# Patient Record
Sex: MALE | Race: WHITE | NOT HISPANIC OR LATINO
[De-identification: names, ages, dates, MRNs, and addresses within clinical notes are randomized per-mention and may not be internally consistent; named-entity substitution may affect disease eponyms.]

---

## 2020-09-14 ENCOUNTER — TRANSCRIPTION ENCOUNTER (OUTPATIENT)
Age: 75
End: 2020-09-14

## 2020-09-25 ENCOUNTER — APPOINTMENT (OUTPATIENT)
Dept: CT IMAGING | Facility: CLINIC | Age: 75
End: 2020-09-25
Payer: MEDICARE

## 2020-09-25 PROCEDURE — 72131 CT LUMBAR SPINE W/O DYE: CPT

## 2020-10-23 ENCOUNTER — NON-APPOINTMENT (OUTPATIENT)
Age: 75
End: 2020-10-23

## 2020-10-23 DIAGNOSIS — E66.3 OVERWEIGHT: ICD-10-CM

## 2020-10-23 DIAGNOSIS — I10 ESSENTIAL (PRIMARY) HYPERTENSION: ICD-10-CM

## 2020-10-23 DIAGNOSIS — M54.5 LOW BACK PAIN: ICD-10-CM

## 2020-10-23 RX ORDER — MEMANTINE HYDROCHLORIDE 28 MG/1
28 CAPSULE, EXTENDED RELEASE ORAL
Refills: 0 | Status: ACTIVE | COMMUNITY

## 2020-10-30 ENCOUNTER — NON-APPOINTMENT (OUTPATIENT)
Age: 75
End: 2020-10-30

## 2020-10-30 DIAGNOSIS — R41.3 OTHER AMNESIA: ICD-10-CM

## 2020-10-30 DIAGNOSIS — R35.1 NOCTURIA: ICD-10-CM

## 2020-10-30 DIAGNOSIS — R45.4 IRRITABILITY AND ANGER: ICD-10-CM

## 2020-11-04 ENCOUNTER — NON-APPOINTMENT (OUTPATIENT)
Age: 75
End: 2020-11-04

## 2020-12-16 ENCOUNTER — APPOINTMENT (OUTPATIENT)
Dept: NEUROLOGY | Facility: CLINIC | Age: 75
End: 2020-12-16
Payer: MEDICARE

## 2020-12-16 VITALS
WEIGHT: 178 LBS | HEIGHT: 71 IN | BODY MASS INDEX: 24.92 KG/M2 | HEART RATE: 69 BPM | DIASTOLIC BLOOD PRESSURE: 76 MMHG | SYSTOLIC BLOOD PRESSURE: 145 MMHG | RESPIRATION RATE: 16 BRPM

## 2020-12-16 VITALS — TEMPERATURE: 96.7 F

## 2020-12-16 DIAGNOSIS — H91.93 UNSPECIFIED HEARING LOSS, BILATERAL: ICD-10-CM

## 2020-12-16 PROCEDURE — 99214 OFFICE O/P EST MOD 30 MIN: CPT

## 2020-12-16 NOTE — ASSESSMENT
[FreeTextEntry1] : Mr. Watson is a 75-year-old with subacute cognitive decline, amnestic type due to Alzheimer's disease.  He is being optimally treated with donepezil and memantine.  He remains functional but has severe short-term memory loss.  I believe that his profound hearing loss is contributing to his functional difficulties.  I strongly encouraged him to purchase hearing aids.  I will reevaluate him in the spring 2021.  Telephone contact will be maintained in the meantime.

## 2020-12-16 NOTE — CONSULT LETTER
[Dear  ___] : Dear  [unfilled], [Consult Letter:] : I had the pleasure of evaluating your patient, [unfilled]. [Please see my note below.] : Please see my note below. [Consult Closing:] : Thank you very much for allowing me to participate in the care of this patient.  If you have any questions, please do not hesitate to contact me. [Sincerely,] : Sincerely, [FreeTextEntry3] : Chauncey Morales MD\par

## 2020-12-16 NOTE — HISTORY OF PRESENT ILLNESS
[FreeTextEntry1] : Mr. Jez Watson was last evaluated on September 17, 2020 via telemedicine. He is a 75-year-old right-handed gentleman who has been under my care since 2013 for mild amnestic symptoms. CSF analysis was consistent with Alzheimer's disease. He remains on donepezil 10 mg and Namenda XR 28 mg per day.\par \par Mrs. Watson is [place].  She complains that her 's memory continues to falter.  He has absolutely no short-term memory.  He steps away to use the bathroom while watching television and when he returns, he has no recollection what he was watching.  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  He is extremely hard of hearing and refuses to use a hearing aid.  He is independent in all ADLs.  He continues to drive.

## 2020-12-16 NOTE — PHYSICAL EXAM
[FreeTextEntry1] : Constitutional:  Patient was well-developed, well-nourished and in no acute distress. \par \par Head:  Normocephalic, atraumatic. Tympanic membranes were clear. \par \par Neck:  Supple with full range of motion. \par \par Cardiovascular:  Cardiac rhythm was regular without murmur. There were no carotid bruits. Peripheral pulses were full and symmetric. \par \par Respiratory:  Lungs were clear. \par \par Abdomen:  Soft and nontender. \par \par Spine:  Nontender. \par \par Skin:  There were no rashes. \par \par NEUROLOGICAL EXAMINATION:\par \par Mental Status: Patient was alert and oriented.  He scored 25 out of 30 on MMSE.  He incorrectly stated the date as the 15th and the day as Tuesday rather than Wednesday.  He had absolutely no recall of 3 words after several minutes.  He scored 24 out of 30 last visit.  Speech was fluent. There was no dysarthria. \par \par Cranial Nerves: \par \par II: He could finger count bilaterally. Pupils were equal and reactive. Visual fields were full.  Fundi were not visualized.\par \par III, IV, VI:  Eye movements were full without nystagmus. \par \par V: Facial sensation was intact. \par \par VII: Facial strength was normal. \par \par VIII: Hearing was severely diminished bilaterally.\par \par IX, X: Palatal movement was normal. Phonation was normal. \par \par XI: Sternocleidomastoids and trapezii were normal. \par \par XII: Tongue was midline and movements normal. There was no lingual atrophy or fasciculations. \par \par Motor Examination: Muscle bulk, tone and strength were normal. \par \par Sensory Examination: Pinprick, vibration and joint position sense were intact. \par \par Reflexes: DTRs were 2 throughout except for the ankle jerks which were absent.\par \par Plantar Responses: Plantar responses were flexor. \par \par Coordination/Cerebellar Function: There was no dysmetria on finger to nose or heel to shin testing. \par \par Gait/Stance: Gait was stable.

## 2021-01-04 RX ORDER — MEMANTINE HYDROCHLORIDE 28 MG/1
28 CAPSULE, EXTENDED RELEASE ORAL
Qty: 90 | Refills: 1 | Status: ACTIVE | COMMUNITY
Start: 2021-01-04 | End: 1900-01-01

## 2021-01-14 ENCOUNTER — APPOINTMENT (OUTPATIENT)
Dept: NEUROLOGY | Facility: CLINIC | Age: 76
End: 2021-01-14

## 2021-04-23 ENCOUNTER — APPOINTMENT (OUTPATIENT)
Dept: ULTRASOUND IMAGING | Facility: CLINIC | Age: 76
End: 2021-04-23
Payer: MEDICARE

## 2021-04-23 PROCEDURE — 76882 US LMTD JT/FCL EVL NVASC XTR: CPT | Mod: LT

## 2021-06-17 ENCOUNTER — APPOINTMENT (OUTPATIENT)
Dept: NEUROLOGY | Facility: CLINIC | Age: 76
End: 2021-06-17
Payer: MEDICARE

## 2021-06-17 VITALS
SYSTOLIC BLOOD PRESSURE: 123 MMHG | DIASTOLIC BLOOD PRESSURE: 68 MMHG | BODY MASS INDEX: 25.34 KG/M2 | WEIGHT: 177 LBS | HEIGHT: 70 IN | HEART RATE: 73 BPM

## 2021-06-17 PROCEDURE — 99072 ADDL SUPL MATRL&STAF TM PHE: CPT

## 2021-06-17 PROCEDURE — 99213 OFFICE O/P EST LOW 20 MIN: CPT

## 2021-06-17 NOTE — ASSESSMENT
[FreeTextEntry1] : Mr. Watson is a 76-year-old with longstanding mild cognitive impairment, amnestic type.  CSF analysis in 2014 was consistent with Alzheimer's disease.  The lack of any major decline over the years certainly places that diagnosis in question.\par \par I suggested that he enroll in the IDEAS2 study.  This will allow performance of an amyloid PET scan.  He will continue donepezil and memantine in the meantime.  Further management will depend upon his clinical course.

## 2021-06-17 NOTE — HISTORY OF PRESENT ILLNESS
[FreeTextEntry1] : Mr. Jez Watson returned to the office having been last seen on December 16, 2020. He is a 76-year-old right-handed gentleman who has been under my care since 2013 for mild amnestic symptoms. CSF analysis was consistent with Alzheimer's disease. He remains on donepezil 10 mg and Namenda XR 28 mg per day.\par \par Mr. Watson is doing relatively well.  He is working at [employer] in the [employer] in [place] 2 days a week, 4 hours each day.  He is very social and courteous.  Short-term memory is mildly worse.  He however is entirely functional.  He is able to dress, bathe, toilet himself and drive.\par \par He lives in a [place] endemic area and has had multiple tick bites.  He is under the care of an internist who is monitoring his blood tests.

## 2021-06-17 NOTE — PHYSICAL EXAM
[FreeTextEntry1] : Constitutional:  Patient was well-developed, well-nourished and in no acute distress. \par \par Head:  Normocephalic, atraumatic. Tympanic membranes were clear. \par \par Neck:  Supple with full range of motion. \par \par Cardiovascular:  Cardiac rhythm was regular without murmur. There were no carotid bruits. Peripheral pulses were full and symmetric. \par \par Respiratory:  Lungs were clear. \par \par Abdomen:  Soft and nontender. \par \par Spine:  Nontender. \par \par Skin:  There were no rashes. \par \par NEUROLOGICAL EXAMINATION:\par \par Mental Status: Patient was alert and oriented.  He scored 27 out of 30 on MMSE, a two-point improvement compared to his last visit.  He made 2 errors in orientation to date and recalled 2 of 3 words.\par Cranial Nerves: \par \par II: He could finger count bilaterally. Pupils were equal and reactive. Visual fields were full.  Fundi were not visualized.\par \par III, IV, VI:  Eye movements were full without nystagmus. \par \par V: Facial sensation was intact. \par \par VII: Facial strength was normal. \par \par VIII: Hearing was severely diminished bilaterally.\par \par IX, X: Palatal movement was normal. Phonation was normal. \par \par XI: Sternocleidomastoids and trapezii were normal. \par \par XII: Tongue was midline and movements normal. There was no lingual atrophy or fasciculations. \par \par Motor Examination: Muscle bulk, tone and strength were normal. \par \par Sensory Examination: Pinprick, vibration and joint position sense were intact. \par \par Reflexes: DTRs were 2 throughout except for the ankle jerks which were absent.\par \par Plantar Responses: Plantar responses were flexor. \par \par Coordination/Cerebellar Function: There was no dysmetria on finger to nose or heel to shin testing. \par \par Gait/Stance: Gait was stable.

## 2021-06-17 NOTE — REVIEW OF SYSTEMS
[Confused or Disoriented] : confusion [Memory Lapses or Loss] : memory loss [Loss Of Hearing] : hearing loss

## 2021-07-11 ENCOUNTER — RX RENEWAL (OUTPATIENT)
Age: 76
End: 2021-07-11

## 2021-07-11 RX ORDER — MEMANTINE HYDROCHLORIDE 28 MG/1
28 CAPSULE, EXTENDED RELEASE ORAL DAILY
Qty: 90 | Refills: 3 | Status: ACTIVE | COMMUNITY
Start: 2020-12-27 | End: 1900-01-01

## 2021-12-02 ENCOUNTER — APPOINTMENT (OUTPATIENT)
Dept: NEUROLOGY | Facility: CLINIC | Age: 76
End: 2021-12-02
Payer: MEDICARE

## 2021-12-02 VITALS
HEIGHT: 70 IN | SYSTOLIC BLOOD PRESSURE: 100 MMHG | WEIGHT: 168 LBS | HEART RATE: 79 BPM | DIASTOLIC BLOOD PRESSURE: 55 MMHG | BODY MASS INDEX: 24.05 KG/M2

## 2021-12-02 PROCEDURE — 99213 OFFICE O/P EST LOW 20 MIN: CPT

## 2021-12-02 NOTE — HISTORY OF PRESENT ILLNESS
[FreeTextEntry1] : Mr. Jez Watson returned to the office having been last seen on June 17, 2021. He is a 76-year-old right-handed gentleman who has been under my care since 2013 for mild amnestic symptoms. CSF analysis was consistent with Alzheimer's disease. He remains on donepezil 10 mg and Namenda XR 28 mg per day.\par \par Mr. Watson was well until October 29 when he appeared ashen, weak and nauseated.  He was evaluated at [facility] in [place].  There was a mild elevation of his white blood count.  Chest x-ray was negative and CT of the brain revealed atrophy and white matter changes.  He recovered spontaneously and has been well ever since.  \par \par He suffers from worsening bilateral tinnitus.  He has never experienced vertigo before.  He is very social and courteous.  Mrs. Watson describes him as chatty.  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  Despite a failing memory, he is entirely functional.  He is able to dress, bathe, toilet himself and drive.\par \par

## 2021-12-02 NOTE — ASSESSMENT
[FreeTextEntry1] : Mr. Watson is a 76-year-old with longstanding mild cognitive impairment, amnestic type.  CSF analysis in 2014 was consistent with Alzheimer's disease.  I suspect that his October 29th episode may have represented peripheral vestibular dysfunction.  I cannot exclude a small posterior circulation TIA.  There was no confirmation of stroke on CT.  He has a pacemaker which was implanted over a decade ago and is likely not MR-compatible.  He is scheduled to see an otolaryngologist.\par \par Because the cost of extended release memantine is prohibitive.  I suggested switching to memantine 10 mg twice a day.  He will continue donepezil 10 mg daily.  He will be reevaluated in 6 months.  Telephone contact will be maintained in the meantime.

## 2021-12-02 NOTE — PHYSICAL EXAM
[FreeTextEntry1] : Constitutional:  Patient was well-developed, well-nourished and in no acute distress. \par \par Head:  Normocephalic, atraumatic. Tympanic membranes were clear. \par \par Neck:  Supple with full range of motion. \par \par Cardiovascular:  Cardiac rhythm was regular without murmur. There were no carotid bruits. Peripheral pulses were full and symmetric. \par \par Respiratory:  Lungs were clear. \par \par Abdomen:  Soft and nontender. \par \par Spine:  Nontender. \par \par Skin:  There were no rashes. \par \par NEUROLOGICAL EXAMINATION:\par \par Mental Status: Patient was alert and congenial.  He scored 25 out of 30 on MMSE, a two-point decline compared to his last visit with the same score as December 2020.  He made 3 errors in orientation to date and 2 in recall.\par \par Cranial Nerves: \par \par II: He could finger count bilaterally. Pupils were equal and reactive. Visual fields were full.  The right optic disc was sharp and the left was poorly visualized.\par \par III, IV, VI:  Eye movements were full without nystagmus. \par \par V: Facial sensation was intact. \par \par VII: Facial strength was normal. \par \par VIII: Hearing was severely diminished bilaterally.\par \par IX, X: Palatal movement was normal. Phonation was normal. \par \par XI: Sternocleidomastoids and trapezii were normal. \par \par XII: Tongue was midline and movements normal. There was no lingual atrophy or fasciculations. \par \par Motor Examination: Muscle bulk, tone and strength were normal. \par \par Sensory Examination: Pinprick, vibration and joint position sense were intact. \par \par Reflexes: DTRs were 2 throughout except for the ankle jerks which were absent.\par \par Plantar Responses: Plantar responses were flexor. \par \par Coordination/Cerebellar Function: There was no dysmetria on finger to nose or heel to shin testing. \par \par Gait/Stance: Gait was stable.  Posture was stooped.  Romberg was negative.  Tandem was mildly unsteady.

## 2022-02-04 RX ORDER — DONEPEZIL HYDROCHLORIDE 10 MG/1
10 TABLET ORAL DAILY
Qty: 90 | Refills: 3 | Status: ACTIVE | COMMUNITY
Start: 2022-02-04 | End: 1900-01-01

## 2022-03-09 ENCOUNTER — APPOINTMENT (OUTPATIENT)
Dept: GASTROENTEROLOGY | Facility: CLINIC | Age: 77
End: 2022-03-09
Payer: MEDICARE

## 2022-03-09 VITALS
WEIGHT: 168 LBS | TEMPERATURE: 97.5 F | HEART RATE: 70 BPM | OXYGEN SATURATION: 98 % | BODY MASS INDEX: 24.05 KG/M2 | RESPIRATION RATE: 18 BRPM | HEIGHT: 70 IN | DIASTOLIC BLOOD PRESSURE: 61 MMHG | SYSTOLIC BLOOD PRESSURE: 122 MMHG

## 2022-03-09 DIAGNOSIS — R63.4 ABNORMAL WEIGHT LOSS: ICD-10-CM

## 2022-03-09 DIAGNOSIS — K62.5 HEMORRHAGE OF ANUS AND RECTUM: ICD-10-CM

## 2022-03-09 PROCEDURE — 99203 OFFICE O/P NEW LOW 30 MIN: CPT

## 2022-03-09 NOTE — REVIEW OF SYSTEMS
[As noted in HPI] : as noted in HPI [Negative] : Respiratory [Fever] : no fever [Chills] : no chills

## 2022-03-09 NOTE — ASSESSMENT
[FreeTextEntry1] : \par 77 year old man with recent rectal bleeding and 10 lb weight loss\par \par Last colonoscopy 10 years ago\par Schedule EGD and colonoscopy

## 2022-03-09 NOTE — HISTORY OF PRESENT ILLNESS
[FreeTextEntry1] : PCP = Dr. Milton Shankar\par \par Tends to be more on the constipated side\par Saw  [facility] on one occasion a few months ago - saw PC - referred for colonoscopy\par \par (+) 10 lb weight loss over the last year\par \par s/p stent 2004, PPM 2009\par Not on Plavix

## 2022-06-03 DIAGNOSIS — Z20.822 CONTACT WITH AND (SUSPECTED) EXPOSURE TO COVID-19: ICD-10-CM

## 2022-06-15 ENCOUNTER — APPOINTMENT (OUTPATIENT)
Dept: NEUROLOGY | Facility: CLINIC | Age: 77
End: 2022-06-15

## 2022-06-27 ENCOUNTER — NON-APPOINTMENT (OUTPATIENT)
Age: 77
End: 2022-06-27

## 2022-07-06 ENCOUNTER — APPOINTMENT (OUTPATIENT)
Dept: GASTROENTEROLOGY | Facility: CLINIC | Age: 77
End: 2022-07-06

## 2022-07-06 ENCOUNTER — LABORATORY RESULT (OUTPATIENT)
Age: 77
End: 2022-07-06

## 2022-07-06 PROCEDURE — 43239 EGD BIOPSY SINGLE/MULTIPLE: CPT

## 2022-07-06 PROCEDURE — 45380 COLONOSCOPY AND BIOPSY: CPT

## 2022-07-20 ENCOUNTER — APPOINTMENT (OUTPATIENT)
Dept: NEUROLOGY | Facility: CLINIC | Age: 77
End: 2022-07-20

## 2022-09-28 ENCOUNTER — APPOINTMENT (OUTPATIENT)
Dept: UROLOGY | Facility: CLINIC | Age: 77
End: 2022-09-28

## 2022-09-28 VITALS
HEIGHT: 70 IN | HEART RATE: 106 BPM | TEMPERATURE: 97.3 F | SYSTOLIC BLOOD PRESSURE: 145 MMHG | BODY MASS INDEX: 24.05 KG/M2 | WEIGHT: 168 LBS | DIASTOLIC BLOOD PRESSURE: 77 MMHG

## 2022-09-28 PROCEDURE — 99204 OFFICE O/P NEW MOD 45 MIN: CPT

## 2022-09-28 RX ORDER — MIRABEGRON 25 MG/1
25 TABLET, FILM COATED, EXTENDED RELEASE ORAL
Qty: 90 | Refills: 1 | Status: ACTIVE | COMMUNITY
Start: 2022-09-28 | End: 1900-01-01

## 2022-09-28 NOTE — HISTORY OF PRESENT ILLNESS
[FreeTextEntry1] : 77 year-old patient presented today to discussed the possible of treatment. He suffers form LUTS for many years and was treated with Doxazosin 2 mg daily . He still continue to suffer from urgency, frequency and weak stream. \par He also had several episodes of dizziness with decreased in blood pressure \par No Family history of PCa \par His wife reported that he has Alzheimer

## 2022-09-28 NOTE — PHYSICAL EXAM
[General Appearance - Well Developed] : well developed [General Appearance - Well Nourished] : well nourished [Normal Appearance] : normal appearance [Well Groomed] : well groomed [General Appearance - In No Acute Distress] : no acute distress [Edema] : no peripheral edema [Respiration, Rhythm And Depth] : normal respiratory rhythm and effort [Exaggerated Use Of Accessory Muscles For Inspiration] : no accessory muscle use [Abdomen Soft] : soft [Abdomen Tenderness] : non-tender [Costovertebral Angle Tenderness] : no ~M costovertebral angle tenderness [Urethral Meatus] : meatus normal [Urinary Bladder Findings] : the bladder was normal on palpation [Scrotum] : the scrotum was normal [Testes Mass (___cm)] : there were no testicular masses [FreeTextEntry1] : we did US and found that prostate is enlarged - 67 ml and PVR is around 100 ml [Normal Station and Gait] : the gait and station were normal for the patient's age [] : no rash [No Focal Deficits] : no focal deficits [Oriented To Time, Place, And Person] : oriented to person, place, and time [Affect] : the affect was normal [Mood] : the mood was normal [Not Anxious] : not anxious [No Palpable Adenopathy] : no palpable adenopathy

## 2022-09-28 NOTE — ASSESSMENT
[FreeTextEntry1] : Suffers from LUTS caused by BPH + possible effect of Alzheimer disease. \par He is on Doxazosin (Cardura) 2 mg and suffers form dizziness and episodes of decreased Blood Pressure \par We did US and found enlarged prostate 67 ml and  ml\par WE started with Tamsulosin 0.4 mg and stopped Doxazosin \par We also discussed the place for Mirbetrique 25 mg \par

## 2022-10-01 ENCOUNTER — RX RENEWAL (OUTPATIENT)
Age: 77
End: 2022-10-01

## 2022-10-12 ENCOUNTER — RX RENEWAL (OUTPATIENT)
Age: 77
End: 2022-10-12

## 2022-11-07 ENCOUNTER — APPOINTMENT (OUTPATIENT)
Dept: UROLOGY | Facility: CLINIC | Age: 77
End: 2022-11-07

## 2022-11-07 VITALS
HEIGHT: 70 IN | WEIGHT: 168 LBS | DIASTOLIC BLOOD PRESSURE: 47 MMHG | BODY MASS INDEX: 24.05 KG/M2 | HEART RATE: 60 BPM | SYSTOLIC BLOOD PRESSURE: 84 MMHG | TEMPERATURE: 97.3 F

## 2022-11-07 PROCEDURE — 99213 OFFICE O/P EST LOW 20 MIN: CPT

## 2022-11-07 NOTE — HISTORY OF PRESENT ILLNESS
[FreeTextEntry1] : 77 year-old patient presented today for the follow-up.\par He suffers form LUTS for many years and was treated with Doxazosin 2 mg daily. Because he had several episodes of dizziness with decreased in blood pressure we decided to stop Doxazosin and started with Tamsulosin 0.4 mg \par He also suffered from urgency, frequency and we started with Trospium \par His wife reported that he has Alzheimer  \par Today he has no complaints and stated that he feels much better, however, his wife told us that he still suffers from dizziness.

## 2022-11-07 NOTE — ASSESSMENT
[FreeTextEntry1] : We discussed te following plan:\par To continue with Tamsulosin and Trospium till the next appointment with PCP\par If the only thing that could explain the dizziness is Tamsulosin - we can consider to stop it\par The next possible step will be Urolift .\par We discussed with he patient in details the procedure and its benefits and adverse effects.\par Patient and his wife understood.  \par

## 2022-11-16 ENCOUNTER — NON-APPOINTMENT (OUTPATIENT)
Age: 77
End: 2022-11-16

## 2022-11-17 ENCOUNTER — NON-APPOINTMENT (OUTPATIENT)
Age: 77
End: 2022-11-17

## 2022-12-20 ENCOUNTER — RX RENEWAL (OUTPATIENT)
Age: 77
End: 2022-12-20

## 2022-12-21 ENCOUNTER — OFFICE (OUTPATIENT)
Dept: URBAN - METROPOLITAN AREA CLINIC 27 | Facility: CLINIC | Age: 77
Setting detail: OPHTHALMOLOGY
End: 2022-12-21
Payer: MEDICARE

## 2022-12-21 ENCOUNTER — APPOINTMENT (OUTPATIENT)
Dept: NEUROLOGY | Facility: CLINIC | Age: 77
End: 2022-12-21

## 2022-12-21 VITALS
SYSTOLIC BLOOD PRESSURE: 144 MMHG | HEIGHT: 70 IN | DIASTOLIC BLOOD PRESSURE: 77 MMHG | HEART RATE: 68 BPM | WEIGHT: 168 LBS | BODY MASS INDEX: 24.05 KG/M2

## 2022-12-21 DIAGNOSIS — H40.013: ICD-10-CM

## 2022-12-21 DIAGNOSIS — H43.813: ICD-10-CM

## 2022-12-21 DIAGNOSIS — H25.13: ICD-10-CM

## 2022-12-21 DIAGNOSIS — H52.4: ICD-10-CM

## 2022-12-21 DIAGNOSIS — H35.363: ICD-10-CM

## 2022-12-21 PROBLEM — H52.7 REFRACTIVE ERROR: Status: ACTIVE | Noted: 2022-12-21

## 2022-12-21 PROCEDURE — 99214 OFFICE O/P EST MOD 30 MIN: CPT

## 2022-12-21 PROCEDURE — 92015 DETERMINE REFRACTIVE STATE: CPT | Performed by: OPHTHALMOLOGY

## 2022-12-21 PROCEDURE — 92014 COMPRE OPH EXAM EST PT 1/>: CPT | Performed by: OPHTHALMOLOGY

## 2022-12-21 ASSESSMENT — REFRACTION_CURRENTRX
OS_ADD: +2.50
OD_SPHERE: +2.00
OS_CYLINDER: +1.00
OS_VPRISM_DIRECTION: PROGS
OS_CYLINDER: SPH
OD_VPRISM_DIRECTION: SV
OS_AXIS: 175
OD_CYLINDER: SPH
OS_SPHERE: +2.00
OD_AXIS: 180
OS_SPHERE: +3.00
OD_OVR_VA: 20/
OD_SPHERE: +3.00
OS_VPRISM_DIRECTION: SV
OD_ADD: +2.50
OD_OVR_VA: 20/
OD_VPRISM_DIRECTION: PROGS
OD_CYLINDER: +1.00
OS_OVR_VA: 20/
OS_OVR_VA: 20/

## 2022-12-21 ASSESSMENT — TONOMETRY
OD_IOP_MMHG: 15
OD_IOP_MMHG: 14
OS_IOP_MMHG: 15
OS_IOP_MMHG: 19

## 2022-12-21 ASSESSMENT — REFRACTION_MANIFEST
OD_AXIS: 170
OS_AXIS: 090
OD_SPHERE: +1.50
OD_ADD: +2.50
OS_CYLINDER: -1.00
OS_SPHERE: +3.00
OD_SPHERE: +3.00
OS_CYLINDER: +1.00
OS_SPHERE: +1.25
OD_VA1: 20/25+2
OD_ADD: +2.50
OS_AXIS: 180
OD_CYLINDER: +0.75
OS_ADD: +2.50
OD_AXIS: 090
OS_VA1: 20/25-3
OD_CYLINDER: -1.00
OS_ADD: +2.50

## 2022-12-21 ASSESSMENT — SPHEQUIV_DERIVED
OD_SPHEQUIV: 2.5
OD_SPHEQUIV: 1.875
OS_SPHEQUIV: 1.75
OS_SPHEQUIV: 1.625
OS_SPHEQUIV: 2.5
OD_SPHEQUIV: 2.375

## 2022-12-21 ASSESSMENT — AXIALLENGTH_DERIVED
OS_AL: 22.3227
OD_AL: 22.5721
OS_AL: 22.5875
OS_AL: 22.6322
OD_AL: 22.7973
OD_AL: 22.6168

## 2022-12-21 ASSESSMENT — REFRACTION_AUTOREFRACTION
OD_CYLINDER: +2.25
OS_CYLINDER: +2.75
OD_SPHERE: +1.25
OS_SPHERE: +0.25
OD_AXIS: 178
OS_AXIS: 003

## 2022-12-21 ASSESSMENT — KERATOMETRY
OS_AXISANGLE_DEGREES: 001
OD_K1POWER_DIOPTERS: 41.50
OD_AXISANGLE_DEGREES: 176
OD_K2POWER_DIOPTERS: 46.00
OS_K2POWER_DIOPTERS: 45.75
OS_K1POWER_DIOPTERS: 43.25

## 2022-12-21 ASSESSMENT — VISUAL ACUITY
OD_BCVA: 20/20
OS_BCVA: 20/20

## 2022-12-21 ASSESSMENT — CONFRONTATIONAL VISUAL FIELD TEST (CVF)
OS_FINDINGS: FULL
OD_FINDINGS: FULL

## 2022-12-21 NOTE — ASSESSMENT
[FreeTextEntry1] : Mr. Watson is a 77-year-old with longstanding mild cognitive impairment, amnestic type.  CSF analysis in 2014 was consistent with Alzheimer's disease.  Despite stable scoring on MMSE, his wife describes progressive cognitive worsening which is not unexpected.  I suggested continuing donepezil and memantine at their current doses.  Further management will depend upon his clinical course.  He will return to the office in 6 months for routine follow-up.  Telephone contact will be maintained in the meantime.

## 2022-12-21 NOTE — PHYSICAL EXAM
[FreeTextEntry1] : Constitutional:  Patient was well-developed, well-nourished and in no acute distress. \par \par Head:  Normocephalic, atraumatic. Tympanic membranes were clear. \par \par Neck:  Supple with full range of motion. \par \par Cardiovascular:  Cardiac rhythm was regular without murmur. There were no carotid bruits. Peripheral pulses were full and symmetric. \par \par Respiratory:  Lungs were clear. \par \par Abdomen:  Soft and nontender. \par \par Spine:  Nontender. \par \par Skin:  There were no rashes. \par \par NEUROLOGICAL EXAMINATION:\par \par Mental Status: Patient was alert and congenial.  He scored 24 out of 30 on MMSE, a one-point decline from his last visit.  He was disoriented regarding the date being unable to recall the month or exact date or season.  He had no recall.  He was able to spell world backwards but required several attempts.\par \par Cranial Nerves: \par \par II: He could finger count bilaterally. Pupils were equal and reactive. Visual fields were full.  The fundi were poorly visualized.\par \par III, IV, VI:  Eye movements were full without nystagmus. \par \par V: Facial sensation was intact. \par \par VII: Facial strength was normal. \par \par VIII: Hearing was severely diminished bilaterally.\par \par IX, X: Palatal movement was normal. Phonation was normal. \par \par XI: Sternocleidomastoids and trapezii were normal. \par \par XII: Tongue was midline and movements normal. There was no lingual atrophy or fasciculations. \par \par Motor Examination: Muscle bulk, tone and strength were normal. \par \par Sensory Examination: Pinprick, vibration and joint position sense were intact. \par \par Reflexes: DTRs were 2 throughout except for the ankle jerks which were absent.\par \par Plantar Responses: Plantar responses were flexor. \par \par Coordination/Cerebellar Function: There was no dysmetria on finger to nose or heel to shin testing. \par \par Gait/Stance: Gait was stable.  Posture was stooped.  Romberg was negative.  Tandem was mildly unsteady.

## 2022-12-21 NOTE — CONSULT LETTER
[Dear  ___] : Dear  [unfilled], [Consult Letter:] : I had the pleasure of evaluating your patient, [unfilled]. [Please see my note below.] : Please see my note below. [Consult Closing:] : Thank you very much for allowing me to participate in the care of this patient.  If you have any questions, please do not hesitate to contact me. [Sincerely,] : Sincerely, [FreeTextEntry3] : Chauncey Morales MD\par  [DrDarrell  ___] : Dr. FERREIRA

## 2022-12-21 NOTE — HISTORY OF PRESENT ILLNESS
[FreeTextEntry1] : Mr. Jez Watson returned to the office having been last seen on December 2, 2021. He is a 77-year-old right-handed gentleman who has been under my care since 2013 for mild amnestic symptoms. CSF analysis was consistent with Alzheimer's disease. He remains on donepezil 10 mg and memantine 10 mg twice a day.\par \par Mr. Watson was accompanied to the office by Mrs. Watson.  She feels that his cognitive abilities have continued to wane.  He is more frequently repetitive.  He has difficulty retaining.  She describes him as "overly optimistic."  He continues to work at [employer] in [place].  He works a short shift 1 to 3 days a week.  He continues to drive and only rarely becomes lost.  He is able to dress, bathe and toilet himself.  He is always well-groomed.\par \par He has documented postural hypotension and is currently on midodrine 5 mg twice a day.  His other medications include donepezil 10 mg daily, memantine 10 mg twice a day, trospium, Ecotrin 81 mg daily, tamsulosin, multivitamin, vitamin B12 and vitamin D3.\par

## 2023-01-17 ENCOUNTER — RX RENEWAL (OUTPATIENT)
Age: 78
End: 2023-01-17

## 2023-02-01 RX ORDER — DONEPEZIL HYDROCHLORIDE 10 MG/1
10 TABLET ORAL DAILY
Qty: 90 | Refills: 3 | Status: ACTIVE | COMMUNITY
Start: 2023-02-01 | End: 1900-01-01

## 2023-02-07 ENCOUNTER — NON-APPOINTMENT (OUTPATIENT)
Age: 78
End: 2023-02-07

## 2023-02-07 ENCOUNTER — APPOINTMENT (OUTPATIENT)
Dept: ULTRASOUND IMAGING | Facility: CLINIC | Age: 78
End: 2023-02-07
Payer: MEDICARE

## 2023-02-07 PROCEDURE — 76700 US EXAM ABDOM COMPLETE: CPT

## 2023-03-26 ENCOUNTER — RX RENEWAL (OUTPATIENT)
Age: 78
End: 2023-03-26

## 2023-04-19 ENCOUNTER — APPOINTMENT (OUTPATIENT)
Dept: UROLOGY | Facility: CLINIC | Age: 78
End: 2023-04-19
Payer: MEDICARE

## 2023-04-19 VITALS
DIASTOLIC BLOOD PRESSURE: 68 MMHG | WEIGHT: 165 LBS | SYSTOLIC BLOOD PRESSURE: 118 MMHG | HEIGHT: 70 IN | BODY MASS INDEX: 23.62 KG/M2 | HEART RATE: 80 BPM | TEMPERATURE: 97.4 F

## 2023-04-19 DIAGNOSIS — N40.1 BENIGN PROSTATIC HYPERPLASIA WITH LOWER URINARY TRACT SYMPMS: ICD-10-CM

## 2023-04-19 DIAGNOSIS — R39.11 HESITANCY OF MICTURITION: ICD-10-CM

## 2023-04-19 DIAGNOSIS — N39.41 URGE INCONTINENCE: ICD-10-CM

## 2023-04-19 DIAGNOSIS — N13.8 BENIGN PROSTATIC HYPERPLASIA WITH LOWER URINARY TRACT SYMPMS: ICD-10-CM

## 2023-04-19 PROCEDURE — 99214 OFFICE O/P EST MOD 30 MIN: CPT

## 2023-04-20 NOTE — HISTORY OF PRESENT ILLNESS
[FreeTextEntry1] : 78 year-old patient presented today for the follow-up.\par Last visit we started this Tamsulosin 0.4 mg \par He also suffered from urgency, frequency and we started with Trospium \par His wife reported that he has Alzheimer  \par Today he has no complaints and stated that he feels much better, however, his wife told us that he still suffers from dizziness. \par However, his wife told that sometimes he has a problem to start urination.

## 2023-05-08 ENCOUNTER — RX RENEWAL (OUTPATIENT)
Age: 78
End: 2023-05-08

## 2023-05-09 ENCOUNTER — RX RENEWAL (OUTPATIENT)
Age: 78
End: 2023-05-09

## 2023-07-09 ENCOUNTER — RX RENEWAL (OUTPATIENT)
Age: 78
End: 2023-07-09

## 2023-07-09 RX ORDER — TAMSULOSIN HYDROCHLORIDE 0.4 MG/1
0.4 CAPSULE ORAL
Qty: 90 | Refills: 0 | Status: ACTIVE | COMMUNITY
Start: 2022-09-28 | End: 1900-01-01

## 2023-08-04 NOTE — REVIEW OF SYSTEMS
[Confused or Disoriented] : confusion [Memory Lapses or Loss] : memory loss [Loss Of Hearing] : hearing loss 0 (no pain/absence of nonverbal indicators of pain)

## 2023-09-18 ENCOUNTER — OFFICE (OUTPATIENT)
Dept: URBAN - METROPOLITAN AREA CLINIC 27 | Facility: CLINIC | Age: 78
Setting detail: OPHTHALMOLOGY
End: 2023-09-18
Payer: MEDICARE

## 2023-09-18 ENCOUNTER — APPOINTMENT (OUTPATIENT)
Dept: NEUROLOGY | Facility: CLINIC | Age: 78
End: 2023-09-18
Payer: MEDICARE

## 2023-09-18 VITALS
WEIGHT: 168 LBS | DIASTOLIC BLOOD PRESSURE: 54 MMHG | HEIGHT: 70 IN | SYSTOLIC BLOOD PRESSURE: 94 MMHG | BODY MASS INDEX: 24.05 KG/M2 | HEART RATE: 66 BPM

## 2023-09-18 DIAGNOSIS — H25.13: ICD-10-CM

## 2023-09-18 DIAGNOSIS — R41.3 OTHER AMNESIA: ICD-10-CM

## 2023-09-18 DIAGNOSIS — H43.813: ICD-10-CM

## 2023-09-18 DIAGNOSIS — H40.013: ICD-10-CM

## 2023-09-18 DIAGNOSIS — H35.363: ICD-10-CM

## 2023-09-18 DIAGNOSIS — H10.45: ICD-10-CM

## 2023-09-18 PROCEDURE — 99214 OFFICE O/P EST MOD 30 MIN: CPT

## 2023-09-18 PROCEDURE — 92014 COMPRE OPH EXAM EST PT 1/>: CPT | Performed by: OPHTHALMOLOGY

## 2023-09-18 ASSESSMENT — REFRACTION_CURRENTRX
OD_VPRISM_DIRECTION: PROGS
OD_AXIS: 180
OS_CYLINDER: +1.00
OS_ADD: +2.50
OS_SPHERE: +1.25
OD_SPHERE: +2.00
OD_ADD: +2.50
OD_VPRISM_DIRECTION: SV
OD_OVR_VA: 20/
OD_OVR_VA: 20/
OD_AXIS: 168
OD_OVR_VA: 20/
OS_SPHERE: +2.00
OS_VPRISM_DIRECTION: PROGS
OD_CYLINDER: +0.75
OD_SPHERE: +1.50
OD_ADD: +2.50
OS_AXIS: 175
OS_CYLINDER: SPH
OS_OVR_VA: 20/
OS_OVR_VA: 20/
OS_VPRISM_DIRECTION: SV
OS_SPHERE: +3.00
OS_VPRISM_DIRECTION: PROGS
OS_ADD: +2.50
OS_AXIS: 178
OD_VPRISM_DIRECTION: PROGS
OS_OVR_VA: 20/
OS_CYLINDER: +1.00
OD_SPHERE: +3.00
OD_CYLINDER: SPH
OD_CYLINDER: +1.00

## 2023-09-18 ASSESSMENT — REFRACTION_AUTOREFRACTION
OS_CYLINDER: +1.00
OD_SPHERE: +1.75
OD_AXIS: 178
OS_SPHERE: +0.50
OD_CYLINDER: +1.75
OS_AXIS: 151

## 2023-09-18 ASSESSMENT — KERATOMETRY
OD_K1POWER_DIOPTERS: 43.75
METHOD_AUTO_MANUAL: AUTO
OS_K2POWER_DIOPTERS: 46.00
OS_AXISANGLE_DEGREES: 179
OD_AXISANGLE_DEGREES: 003
OD_K2POWER_DIOPTERS: 45.25
OS_K1POWER_DIOPTERS: 38.75

## 2023-09-18 ASSESSMENT — AXIALLENGTH_DERIVED
OS_AL: 23.0441
OD_AL: 22.3227
OS_AL: 23.6155
OD_AL: 22.5429
OS_AL: 23.3263
OD_AL: 22.2792

## 2023-09-18 ASSESSMENT — REFRACTION_MANIFEST
OD_SPHERE: +3.00
OS_ADD: +2.50
OS_VA1: 20/25-3
OD_CYLINDER: -1.00
OD_ADD: +2.50
OS_SPHERE: +3.00
OS_ADD: +2.50
OD_ADD: +2.50
OS_SPHERE: +1.25
OS_AXIS: 180
OD_VA1: 20/25+2
OD_AXIS: 090
OD_AXIS: 170
OD_CYLINDER: +0.75
OD_SPHERE: +1.50
OS_CYLINDER: +1.00
OS_CYLINDER: -1.00
OS_AXIS: 090

## 2023-09-18 ASSESSMENT — SPHEQUIV_DERIVED
OD_SPHEQUIV: 1.875
OS_SPHEQUIV: 2.5
OD_SPHEQUIV: 2.625
OS_SPHEQUIV: 1.75
OD_SPHEQUIV: 2.5
OS_SPHEQUIV: 1

## 2023-09-18 ASSESSMENT — VISUAL ACUITY
OD_BCVA: 20/25-2
OS_BCVA: 20/20

## 2023-09-18 ASSESSMENT — TONOMETRY
OD_IOP_MMHG: 12
OD_IOP_MMHG: 15
OS_IOP_MMHG: 21

## 2023-09-18 ASSESSMENT — CONFRONTATIONAL VISUAL FIELD TEST (CVF)
OD_FINDINGS: FULL
OS_FINDINGS: FULL

## 2023-10-04 ENCOUNTER — RX RENEWAL (OUTPATIENT)
Age: 78
End: 2023-10-04

## 2023-11-02 ENCOUNTER — RX RENEWAL (OUTPATIENT)
Age: 78
End: 2023-11-02

## 2023-11-02 RX ORDER — DONEPEZIL HYDROCHLORIDE 10 MG/1
10 TABLET ORAL DAILY
Qty: 100 | Refills: 2 | Status: ACTIVE | COMMUNITY
Start: 2023-02-09 | End: 1900-01-01

## 2024-01-24 ENCOUNTER — APPOINTMENT (OUTPATIENT)
Dept: ORTHOPEDIC SURGERY | Facility: CLINIC | Age: 79
End: 2024-01-24

## 2024-03-24 ENCOUNTER — RX RENEWAL (OUTPATIENT)
Age: 79
End: 2024-03-24

## 2024-03-24 RX ORDER — MEMANTINE HYDROCHLORIDE 10 MG/1
10 TABLET, FILM COATED ORAL
Qty: 200 | Refills: 2 | Status: ACTIVE | COMMUNITY
Start: 2021-12-02 | End: 1900-01-01

## 2024-06-09 ENCOUNTER — RX RENEWAL (OUTPATIENT)
Age: 79
End: 2024-06-09

## 2024-06-09 RX ORDER — TROSPIUM CHLORIDE 20 MG/1
20 TABLET, FILM COATED ORAL
Qty: 200 | Refills: 2 | Status: ACTIVE | COMMUNITY
Start: 2022-09-29 | End: 1900-01-01

## 2024-07-02 ENCOUNTER — APPOINTMENT (OUTPATIENT)
Dept: NEUROLOGY | Facility: CLINIC | Age: 79
End: 2024-07-02
Payer: MEDICARE

## 2024-07-02 VITALS — HEART RATE: 85 BPM | SYSTOLIC BLOOD PRESSURE: 137 MMHG | DIASTOLIC BLOOD PRESSURE: 66 MMHG

## 2024-07-02 DIAGNOSIS — F02.80 ALZHEIMER'S DISEASE WITH LATE ONSET: ICD-10-CM

## 2024-07-02 DIAGNOSIS — G30.1 ALZHEIMER'S DISEASE WITH LATE ONSET: ICD-10-CM

## 2024-07-02 PROCEDURE — 99213 OFFICE O/P EST LOW 20 MIN: CPT

## 2024-07-06 ENCOUNTER — RX RENEWAL (OUTPATIENT)
Age: 79
End: 2024-07-06

## 2024-08-06 ENCOUNTER — APPOINTMENT (OUTPATIENT)
Dept: UROLOGY | Facility: CLINIC | Age: 79
End: 2024-08-06

## 2024-08-06 PROCEDURE — 99213 OFFICE O/P EST LOW 20 MIN: CPT

## 2024-08-06 NOTE — HISTORY OF PRESENT ILLNESS
[FreeTextEntry1] : As per Dr. Green last note 78 year-old patient presented today for the follow-up. Last visit we started this Tamsulosin 0.4 mg  He also suffered from urgency, frequency and we started with Trospium  His wife reported that he has Alzheimer   Today he has no complaints and stated that he feels much better, however, his wife told us that he still suffers from dizziness.  However, his wife told that sometimes he has a problem to start urination.   Today pt doing well and offers no complaints.

## 2024-08-13 ENCOUNTER — APPOINTMENT (OUTPATIENT)
Dept: UROLOGY | Facility: CLINIC | Age: 79
End: 2024-08-13

## 2025-02-26 ENCOUNTER — APPOINTMENT (OUTPATIENT)
Dept: OPHTHALMOLOGY | Facility: CLINIC | Age: 80
End: 2025-02-26

## 2025-02-28 ENCOUNTER — APPOINTMENT (OUTPATIENT)
Dept: OPHTHALMOLOGY | Facility: CLINIC | Age: 80
End: 2025-02-28
Payer: SELF-PAY

## 2025-02-28 ENCOUNTER — NON-APPOINTMENT (OUTPATIENT)
Age: 80
End: 2025-02-28

## 2025-02-28 ENCOUNTER — APPOINTMENT (OUTPATIENT)
Dept: OPHTHALMOLOGY | Facility: CLINIC | Age: 80
End: 2025-02-28
Payer: MEDICARE

## 2025-02-28 PROCEDURE — 92004 COMPRE OPH EXAM NEW PT 1/>: CPT

## 2025-02-28 PROCEDURE — 92015 DETERMINE REFRACTIVE STATE: CPT

## 2025-02-28 PROCEDURE — 92201 OPSCPY EXTND RTA DRAW UNI/BI: CPT

## 2025-04-30 ENCOUNTER — NON-APPOINTMENT (OUTPATIENT)
Age: 80
End: 2025-04-30

## 2025-05-01 ENCOUNTER — APPOINTMENT (OUTPATIENT)
Dept: NEUROLOGY | Facility: CLINIC | Age: 80
End: 2025-05-01
Payer: MEDICARE

## 2025-05-01 VITALS
HEIGHT: 69 IN | HEART RATE: 75 BPM | SYSTOLIC BLOOD PRESSURE: 130 MMHG | WEIGHT: 163 LBS | DIASTOLIC BLOOD PRESSURE: 72 MMHG | BODY MASS INDEX: 24.14 KG/M2

## 2025-05-01 DIAGNOSIS — F02.80 ALZHEIMER'S DISEASE WITH LATE ONSET: ICD-10-CM

## 2025-05-01 DIAGNOSIS — G30.1 ALZHEIMER'S DISEASE WITH LATE ONSET: ICD-10-CM

## 2025-05-01 PROCEDURE — 99213 OFFICE O/P EST LOW 20 MIN: CPT

## 2025-06-06 RX ORDER — MEMANTINE HYDROCHLORIDE 28 MG/1
28 CAPSULE, EXTENDED RELEASE ORAL
Qty: 90 | Refills: 3 | Status: ACTIVE | COMMUNITY
Start: 2025-06-06 | End: 1900-01-01